# Patient Record
Sex: FEMALE | Race: OTHER | Employment: OTHER | URBAN - METROPOLITAN AREA
[De-identification: names, ages, dates, MRNs, and addresses within clinical notes are randomized per-mention and may not be internally consistent; named-entity substitution may affect disease eponyms.]

---

## 2023-11-15 ENCOUNTER — HOSPITAL ENCOUNTER (EMERGENCY)
Age: 58
Discharge: HOME | End: 2023-11-16
Attending: EMERGENCY MEDICINE

## 2023-11-15 VITALS
DIASTOLIC BLOOD PRESSURE: 71 MMHG | SYSTOLIC BLOOD PRESSURE: 137 MMHG | HEART RATE: 110 BPM | OXYGEN SATURATION: 100 % | RESPIRATION RATE: 17 BRPM | TEMPERATURE: 98.1 F

## 2023-11-15 DIAGNOSIS — F41.1 ANXIETY STATE: ICD-10-CM

## 2023-11-15 DIAGNOSIS — F19.10 SUBSTANCE ABUSE (HCC): Primary | ICD-10-CM

## 2023-11-15 PROCEDURE — 99282 EMERGENCY DEPT VISIT SF MDM: CPT

## 2023-11-15 RX ORDER — 0.9 % SODIUM CHLORIDE 0.9 %
1000 INTRAVENOUS SOLUTION INTRAVENOUS ONCE
Status: DISCONTINUED | OUTPATIENT
Start: 2023-11-15 | End: 2023-11-15

## 2023-11-16 NOTE — ED TRIAGE NOTES
Ambulatory to triage    used   Pt took and edible to help with arthritis and now feels crazy, pt states she was seeing things in the shower.

## 2023-11-16 NOTE — ED PROVIDER NOTES
Emergency Department Provider Note       PCP: No primary care provider on file. Age: 61 y.o. Sex: female     DISPOSITION Decision To Discharge 11/15/2023 11:53:58 PM       ICD-10-CM    1. Substance abuse ([address])  F19.10       2. Anxiety state  F41.1           Medical Decision Making     Complexity of Problems Addressed:  1 or more acute illnesses that pose a threat to life or bodily function. Data Reviewed and Analyzed:  I independently ordered and reviewed each unique test.       ===================================  ED EKG Interpretation  EKG was interpreted in the absence of a cardiologist.    Rate: 113  EKG Interpretation: EKG Interpretation: sinus rhythm, no evidence of arrhythmia and no acute changes  ST Segments: Normal ST segments - NO STEMI    Saulo Cain MD 11:54 PM            Discussion of management or test interpretation. Patient is a 25-year-old female who after dinner marijuana edible and then felt mild increasing and was seeing objects in the shower. Patient states those symptoms have improved patient has no chest pain chest pressure shortness breath nausea vomiting or headache. Patient states she was peripherally fine just prior to this. Translation services provided by nursing staff    Differential diagnosis includes but is not limited to substance abuse, Anxiety    Patient physical exam is unremarkable except for patient appears anxious otherwise unremarkable. Patient EKG shows sinus tach rate of 113 but no STEMI no arrhythmia. Patient's symptoms have improving. We will DC home and outpatient follow-up with PCP as needed. Patient is in the care of her family. Risk of Complications and/or Morbidity of Patient Management:  Shared medical decision making was utilized in creating the patients health plan today.          History       Patient is a 25-year-old female who after dinner marijuana edible and then felt mild increasing and was seeing objects in the

## 2025-02-28 ENCOUNTER — OFFICE VISIT (OUTPATIENT)
Dept: ORTHOPEDIC SURGERY | Age: 60
End: 2025-02-28

## 2025-02-28 DIAGNOSIS — M17.12 PATELLOFEMORAL ARTHRITIS OF LEFT KNEE: ICD-10-CM

## 2025-02-28 DIAGNOSIS — M17.11 PATELLOFEMORAL ARTHRITIS OF RIGHT KNEE: ICD-10-CM

## 2025-02-28 DIAGNOSIS — M25.562 LEFT KNEE PAIN, UNSPECIFIED CHRONICITY: Primary | ICD-10-CM

## 2025-02-28 RX ORDER — METHYLPREDNISOLONE ACETATE 40 MG/ML
80 INJECTION, SUSPENSION INTRA-ARTICULAR; INTRALESIONAL; INTRAMUSCULAR; SOFT TISSUE ONCE
Status: COMPLETED | OUTPATIENT
Start: 2025-02-28 | End: 2025-02-28

## 2025-02-28 RX ORDER — MELOXICAM 7.5 MG/1
7.5-15 TABLET ORAL 2 TIMES DAILY PRN
Qty: 60 TABLET | Refills: 1 | Status: SHIPPED | OUTPATIENT
Start: 2025-02-28

## 2025-02-28 RX ADMIN — METHYLPREDNISOLONE ACETATE 80 MG: 40 INJECTION, SUSPENSION INTRA-ARTICULAR; INTRALESIONAL; INTRAMUSCULAR; SOFT TISSUE at 10:03

## 2025-02-28 NOTE — PROGRESS NOTES
Name: Justine Pereyra  YOB: 1965  Gender: female  MRN: [id number]    CC:   Chief Complaint   Patient presents with    Knee Pain     Left knee          HPI: Justine Pereyra is a 59 y.o. female with a PMHx of gout here for evaluation of left knee pain.  The pain has been present for 3 weeks and is becoming worse. She denies any injury or falls  The pain is primarily on the Lateral, anterior side.   she describes the pain as aching, sharp, grinding, and and often feels unstable  The pain is worse with going up and/or down stairs, rising after sitting, squatting, standing, and walking  The pain does not radiate down the leg.  she denies numbness and tingling down the leg.   Treatment so far has been activity modification and indocin  with little relief.      No Known Allergies      Review of Systems:  As per HPI.  Pertinent positives and negatives are addressed with the patient, particularly those related to musculoskeletal concerns.   Non-orthopaedic concerns were referred back to the primary care physician.    PHYSICAL EXAMINATION:   The patient appears their stated age and they are in no distress.  There were no vitals taken for this visit.      The lower extremities are as described below.  Circulation is normal with palpable pedal pulses bilaterally and no edema.  There is no lymph adenopathy in the popliteal or malleolar region.  The skin is without stasis disease distally bilaterally.  Sensation is intact to light touch bilaterally.  There is mild tenderness to palpation over the intermediate joint line of the left knee(s)  The gait is noted to be with a slight trendelenburg and antalgia  Range of motion is 0-120 degrees on the right and 0-120 degrees on the left with crepitance bilaterally.  left knee: There is 2mm of anterior/posterior translation and 2mm of medial/lateral instability bilaterally.  There is 2 degrees of varus alignment in the left knee.  Limb lengths are